# Patient Record
Sex: FEMALE | Race: WHITE
[De-identification: names, ages, dates, MRNs, and addresses within clinical notes are randomized per-mention and may not be internally consistent; named-entity substitution may affect disease eponyms.]

---

## 2018-02-15 ENCOUNTER — HOSPITAL ENCOUNTER (OUTPATIENT)
Dept: HOSPITAL 68 - STS | Age: 43
End: 2018-02-15
Attending: SPECIALIST
Payer: COMMERCIAL

## 2018-02-15 VITALS — HEIGHT: 65 IN | BODY MASS INDEX: 48.82 KG/M2 | WEIGHT: 293 LBS

## 2018-02-15 DIAGNOSIS — Z79.84: ICD-10-CM

## 2018-02-15 DIAGNOSIS — I10: ICD-10-CM

## 2018-02-15 DIAGNOSIS — N85.2: ICD-10-CM

## 2018-02-15 DIAGNOSIS — N92.0: Primary | ICD-10-CM

## 2018-02-15 DIAGNOSIS — E11.9: ICD-10-CM

## 2018-02-15 DIAGNOSIS — E66.01: ICD-10-CM

## 2018-02-19 NOTE — OPERATIVE REPORT
Operative/Inv Procedure Report
Surgery Date: 02/15/18
Name of Procedure:
D&C hysteroscopy
Pre-Operative Diagnosis:
Metromenorrhagia
Post-Operative Diagnosis:
Same
Estimated Blood Loss: 500
Surgeon/Assistant:
Yessica Aceves MD
 
Anesthesia: moderate sedation
 
Operative/Procedure Note
Note:
Patient was taken to the operating room placed in dorsal supine position.  After
adequate anesthesia, patient was prepped and draped for surgery.  Examination 
under anesthesia was performed.  CO2 tenaculum was placed on the anterior lip of
the cervix gentle downward traction was used.  The cervix was dilated 29 Hegar 
to left insertion of the hysteroscope.  Under direct visualization to 
hysteroscopy was performed using gas hysteroscope was removed and endocervical 
curettage was performed.  And endometrial curettage was performed.  All 
instruments removed from the vagina.  The counts were correct the patient was 
awakened from anesthesia.  And transported to recovery room awake and alert.
Findings:
Slightly enlarged uterus no adnexal masses heavy uterine lining normal

## 2018-04-22 ENCOUNTER — HOSPITAL ENCOUNTER (EMERGENCY)
Dept: HOSPITAL 68 - ERH | Age: 43
End: 2018-04-22
Payer: COMMERCIAL

## 2018-04-22 VITALS — BODY MASS INDEX: 50.02 KG/M2 | HEIGHT: 64 IN | WEIGHT: 293 LBS

## 2018-04-22 VITALS — SYSTOLIC BLOOD PRESSURE: 146 MMHG | DIASTOLIC BLOOD PRESSURE: 82 MMHG

## 2018-04-22 DIAGNOSIS — N93.9: Primary | ICD-10-CM

## 2018-04-22 DIAGNOSIS — N39.0: ICD-10-CM

## 2018-04-22 LAB
ABSOLUTE GRANULOCYTE CT: 7.6 /CUMM (ref 1.4–6.5)
APTT BLD: 32 SEC (ref 25–37)
BASOPHILS # BLD: 0 /CUMM (ref 0–0.2)
BASOPHILS NFR BLD: 0.5 % (ref 0–2)
EOSINOPHIL # BLD: 0.3 /CUMM (ref 0–0.7)
EOSINOPHIL NFR BLD: 2.7 % (ref 0–5)
ERYTHROCYTE [DISTWIDTH] IN BLOOD BY AUTOMATED COUNT: 14.5 % (ref 11.5–14.5)
GRANULOCYTES NFR BLD: 72.1 % (ref 42.2–75.2)
HCT VFR BLD CALC: 34.6 % (ref 37–47)
LYMPHOCYTES # BLD: 2.1 /CUMM (ref 1.2–3.4)
MCH RBC QN AUTO: 26.3 PG (ref 27–31)
MCHC RBC AUTO-ENTMCNC: 33.4 G/DL (ref 33–37)
MCV RBC AUTO: 78.7 FL (ref 81–99)
MONOCYTES # BLD: 0.5 /CUMM (ref 0.1–0.6)
PLATELET # BLD: 254 /CUMM (ref 130–400)
PMV BLD AUTO: 8.9 FL (ref 7.4–10.4)
PROTHROMBIN TIME: 10.2 SEC (ref 9.4–12.5)
RED BLOOD CELL CT: 4.4 /CUMM (ref 4.2–5.4)
WBC # BLD AUTO: 10.6 /CUMM (ref 4.8–10.8)

## 2018-04-22 NOTE — ULTRASOUND REPORT
EXAMINATION:
US TRANSVAGINAL
 
CLINICAL INFORMATION:
Heavy vaginal bleeding/pain.
 
COMPARISON:
No direct comparisons.
 
TECHNIQUE:
Transvaginal and transabdominal imaging of the pelvis was obtained with color
Doppler.
 
FINDINGS:
The uterus is anteverted measuring up to 11.5 x 5.3 x 4.8 cm. The uterus is
unremarkable in appearance. There is normal Doppler flow.
 
Endometrium is thickened measuring up to 2.1 cm and is slightly heterogenous
in echogenicity. Small amount of endometrial fluid.
 
Cervical length of 3.72 m.
 
The ovaries are not visualized.
 
No significant free fluid in the pelvis.
 
IMPRESSION:
Heterogenous and mildly thickened endometrium measuring up to 2.1 cm. Some of
the endometrial thickening may be due to a small amount of heterogenous
endometrial fluid. Consider follow-up ultrasound in 7-10 days or gynecologic
consultation.
 
Nonvisualization of the ovaries.

## 2018-04-22 NOTE — ED GI/GU/ABDOMINAL COMPLAINT
History of Present Illness
 
General
Chief Complaint: Abdominal Pain/Flank Pain
Stated Complaint: ABD PAIN
Source: patient
Exam Limitations: no limitations
 
Vital Signs & Intake/Output
Vital Signs & Intake/Output
 Vital Signs
 
 
Date Time Temp Pulse Resp B/P B/P Pulse O2 O2 Flow FiO2
 
     Mean Ox Delivery Rate 
 
 1453 98.0 79 20 146/82  99 Room Air  
 
 1250 98.2 78 18 188/95  99 Room Air  
 
 1059 97.8 103 15 153/89  99 Room Air Room Air 
 
 
 
Allergies
Coded Allergies:
adhesive tape (Intermediate, SKIN IRRITATION 18)
MDX - Latex (LATEX) (Mild, RASH 18)
 
Reconcile Medications
Acetaminophen (Tylenol Xstr) 500 MG TAB   2 TAB PO PRN HEADACHE  (Reported)
Albuterol Sulfate (Proventil) 2.5 MG/3 ML NEB   3 ML INH Q4P PRN SHORTNESS OF 
BREATH
Amoxicillin 500 MG CAPSULE   1 TAB PO TID BRONCHITIS
Azithromycin (Zithromax Z-Vignesh) 250 MG CAP   1 TAB PO DAILY INFN
     2 TABS ON DAY 1 THEN 1 TAB ONCE A DAY FOR 4 MORE DAYS
Diphenhydramine HCl (Benadryl) 50 MG/ML VIAL   25 MG IV Q6P PRN ITCH
Hyoscyamine (Levsin 0.125MG Tab) 0.125 MG TAB   1 TAB PO Q6 ABDOMINAL
Ibuprofen 800 MG TABLET   800 MG PO Q6P PRN PAIN SCALE 4-6
Loperamide Hydrochloride (Imodium) 2 MG CAP   1 TAB PO Q12 PRN Diarrhea
Nitrofurantoin Monohyd/M-Cryst (Macrobid 100 MG Capsule) 100 MG CAPSULE   1 CAP 
PO BID UTI
     with food
Omeprazole (Prilosec Otc) 20 MG TCP   1 TAB PO QDAY STOMACH PAIN
OXYCODONE HCL/ACETAMINOPHEN (Percocet 5-325 MG Tablet) 325 MG/5 MG TAB   2 TAB 
PO Q4P PRN PAIN SCALE 9-10
PNV#26/IRON POLY/FA/DHA (Vitafol-One Capsule) 29 MG IRON-1 MG-200 MG CAPSULE   1
CAP PO DAILY PRENATAL  (Reported)
Prednisone 20 MG TABLET   2 TAB PO DAILY BRONCH`0SPASM
Prednisone 10 MG TABLET   3 TAB PO DAILY BRONCHITIS
PSEUDOEPHEDRINE HCL (Sudafed 12 Hour) 120 MG TABLET.ER   1 TAB PO PRN DIFFICULTY
BREATHING/CONGESTIO  (Reported)
Sucralfate (Carafate 1GM <1000MG> Tab) 1,000 MG TAB   1 TAB PO 4 TIMES/DAY 
REFLUX/STOMACH PAIN
Tramadol HCl (Ultram) 50 MG TAB   1 TAB PO Q6 Abdominal pain
Tranexamic Acid (Lysteda) 650 MG TABLET   2 TAB PO TID HEAVY BLEEDING
     during menses
 
Triage Note:
PT TO ED FOR C/C OF LOWER ABD PAIN THAT STARTED
 AND HAS BEEN CONSTANT X 24 HOURS. PT HAS HAD
 CHANGES IN HER MENSTRUAL CYCLE AND HAS SEEN DR. FRANKLIN FOR IT.  HAS BEEN BLEEDING SINCE 
 AND HASN'T STOPPED. HAS HAD A D AND C TO HELP STOP
 BLEEDING WHICH TEMPORARILY WORKED BUT NOW BLEEDING
 CAME BACK.
Triage Nurses Notes Reviewed? yes
Pregnant? N
Is pt currently breastfeeding? No
Onset: Gradual
Duration: worse persistent since (1-2 MONTHS)
Timing: recent history
Quality/Severity: cramping
Severity Numbers: 8
Location: suprapubic
Radiation: no radiation
Activities at Onset: none
Prior Abdominal Problems: similar symptoms
Past Sexual History: Unobtainable at this time
No Modifying Factors: none
HPI:
Patient is a 44 YO female presenting to the emergency department chief complaint
of worsening lower abdominal pain, increased vaginal bleeding on the past 
several days.  She reports that she's been bleeding since .  She has been
seeing her OB/GYN for similar symptoms over the past 1-2 months.  She's had a D&
C without success, she was also placed on Depo-Provera without improvement.  She
saw her OB/GYN 2 days ago and the likely plan is to do a hysterectomy.  Patient 
came in today for worsening symptoms over the weekend.  She been taking 
ibuprofen 800 without relief.  Pain on arrival is currently 8 out of 10 and 
nonradiating.  Denies any urinary frequency urgency or dysuria.  No vaginal 
discharge besides bleeding.
(Shweta Urbina)
 
Past History
 
Travel History
Traveled to [place] past 21 day No
 
Medical History
Any Pertinent Medical History? see below for history
Neurological: NONE
EENT: NONE
Cardiovascular: NONE
Respiratory: NONE
Gastrointestinal: ventral hernia cholelithiasis GASTRITIS
Hepatic: NONE
Renal: NONE
Musculoskeletal: fibromyalgia
Psychiatric: NONE
Endocrine: NONE
GYN/Reproductive: 1 miscarriage gestational diabetes
Tetanus Vaccine: 12
 
Surgical History
Surgical History: , tonsillectomy
 
Psychosocial History
Services at Home None
What is your primary language English
Tobacco Use: Quit >30 days ago
ETOH Use: denies use
Illicit Drug Use: denies illicit drug use
 
Family History
Hx Contributory? No
(Shweta Urbina)
 
Review of Systems
 
Review of Systems
Constitutional:
Reports: no symptoms. 
Comments
Review of systems: See HPI, All other systems negative.
Constitutional, no chills fever or weight loss
HEENT: No visual changes no sore throat no congestion
Cardiovascular: No chest pain ,palpitation , orthopnea or ankle swelling
Skin, no jaundice no rashes
Respiratory: No dyspnea cough sputum or hemoptysis
GI: No nausea no vomiting
: No dysuria No hematuria
Muscle skeletal: no back pain, no neck pain,
Neurologic: No numbness no confusion, NO HEADACHES
Psych: No stress anxiety or depression,.
Heme/endocrine:  no polyuria or polydipsia
Immunology: No splenectomy or history of AIDS
(Shweta Urbina)
 
Physical Exam
 
Physical Exam
General Appearance: alert, awake, comfortable, obese
Gastrointestinal: normal bowel sounds, soft, tenderness
Comments:
Obese person in no acute distress
HEENT:Pupils equally round and reactive to light and accommodation. Nose is 
atraumatic.  No signs of pallor noted to ocular conjunctiva bilaterally.
Neck: Normal inspection, full range of motion
Back: Nontender, no CVA tenderness. 
Cardiovascular: Regular rate and rhythms no murmurs rubs or gallops, normal JVP
Respiratory: Chest nontender. No respiratory distress.breath sounds clear to 
auscultation bilaterally
Abdomen: Soft, obese, tender to palpation suprapubic region bilaterally, mild 
guarding, no rebound tenderness nondistended, no appreciable organomegaly. 
Normal bowel sounds. No ascites
Pelvic: Unable to visualize cervix secondary to extent of bleeding in the 
vaginal canal.  One large clot approximately 2-3 cm in size noted. NORMAL 
APPEARING EXTERNAL GENITALIA.
Extremity: No edema
Neuro: Alert oriented x3
Skin: No appreciable rash on exposed skin, skin is warm and dry.
Psych: Mood and affect is normal, memory and judgment is normal.
 
Core Measures
ACS in differential dx? No
Sepsis Present: No
Sepsis Focused Exam Completed? No
(Shweta Urbina)
 
Progress
Differential Diagnosis: DYSMENORRHEA, ACUTE ANEMIA, SYMPTOMATIC ANEMIA, 
ENDOMETRIOSIS, ENDOMETRITIS
Plan of Care:
 Orders
 
 
Procedure Date/time Status
 
Add-on Test (ER Only)  1516 Active
 
Add-on Test (ER Only)  1139 Active
 
CULTURE,URINE  1124 Active
 
TYPE & SCREEN (NOT X-MATCH)  112 Complete
 
PARTIAL THROMBOPLASTIN TIME  111 Complete
 
PROTHROMBIN TIME  111 Complete
 
URINE PREGNANCY  1103 Complete
 
URINALYSIS  1103 Complete
 
LIPASE  1103 Complete
 
COMPREHENSIVE METABOLIC PANEL 1103 Complete
 
CBC WITHOUT DIFFERENTIAL 1103 Complete
 
 
 Laboratory Tests
 
 
 
18 1124:
Urinalysis LIGHT  H, Urine Color YEL, Urine Clarity HAZY  H, Urine pH 6.0, Ur 
Specific Gravity >= 1.030, Urine Protein 30  H, Urine Ketones NEG, Urine Nitrite
NEG, Urine Bilirubin NEG, Urine Urobilinogen 0.2, Ur Leukocyte Esterase MOD  H, 
Ur Microscopic SEDIMENT EXAMINED, Urine RBC PACKD  H, Urine WBC 15-25  H, Ur 
Epithelial Cells FEW, Urine Bacteria MOD  H, Urine Mucus FEW, Urine Hemoglobin 
LARGE  H, Urine Glucose NEG, Urine Pregnancy Test NEGATIVE
 
18 1120:
Anion Gap 13, Estimated GFR > 60, BUN/Creatinine Ratio 17.1, Glucose 167  H, 
Calcium 9.2, Total Bilirubin 0.5, AST 13  L, ALT 20, Alkaline Phosphatase 71, 
Total Protein 7.2, Albumin 4.0, Globulin 3.2, Albumin/Globulin Ratio 1.3, Lipase
54, PT 10.2, INR 0.94, APTT 32, CBC w Diff NO MAN DIFF REQ, RBC 4.40, MCV 78.7  
L, MCH 26.3  L, MCHC 33.4, RDW 14.5, MPV 8.9, Gran % 72.1, Lymphocytes % 19.8  L
, Monocytes % 4.9, Eosinophils % 2.7, Basophils % 0.5, Absolute Granulocytes 7.6
 H, Absolute Lymphocytes 2.1, Absolute Monocytes 0.5, Absolute Eosinophils 0.3, 
Absolute Basophils 0
 Microbiology
1124  URINE ROUT: Urine Culture - RECD
 
 
 
2018 3:13:22 PM orthostatics are negative, H&H is stable.  Patient's vitals
are stable.  Spoke with Dr. Blanchard, patient's OB/GYN.  SHE recommended we start 
patient on Lysteda for the next 5 days and she'll follow-up with her in the 
office.  Patient agreeable to plan.  Educated on signs and symptoms to return.
Diagnostic Imaging:
Viewed by Me: Ultrasound.  Discussed w/RAD: Ultrasound. 
Radiology Impression: PATIENT: ADDIS PICKERING  MEDICAL RECORD NO: [id number] PRESENT 
AGE: 43  PATIENT ACCOUNT NO: [id number] : 75  LOCATION: [facility] ORDERING 
PHYSICIAN: Shweta GALINDO     SERVICE DATE:  EXAM TYPE: US - 
US-TRANSVAGINAL EXAMINATION: US TRANSVAGINAL CLINICAL INFORMATION: Heavy vaginal
bleeding/pain. COMPARISON: No direct comparisons. TECHNIQUE: Transvaginal and 
transabdominal imaging of the pelvis was obtained with color Doppler. FINDINGS: 
The uterus is anteverted measuring up to 11.5 x 5.3 x 4.8 cm. The uterus is 
unremarkable in appearance. There is normal Doppler flow. Endometrium is 
thickened measuring up to 2.1 cm and is slightly heterogenous in echogenicity. 
Small amount of endometrial fluid. Cervical length of 3.72 m. The ovaries are 
not visualized. No significant free fluid in the pelvis. IMPRESSION: 
Heterogenous and mildly thickened endometrium measuring up to 2.1 cm. Some of 
the endometrial thickening may be due to a small amount of heterogenous 
endometrial fluid. Consider follow-up ultrasound in 7-10 days or gynecologic 
consultation. Nonvisualization of the ovaries. DICTATED BY: Kyrie Murphy MD  
DATE/TIME DICTATED:18 :RAD.DOSS  DATE/TIME 
TRANSCRIBED:18 CONFIDENTIAL, DO NOT COPY WITHOUT APPROPRIATE 
AUTHORIZATION.  <Electronically signed in Other Vendor System>                  
                                                                     SIGNED BY: 
Kyrie Murphy MD 18 1416
Initial ED EKG: none
(Daniel GALINDO,Shweta)
 
Departure
 
Departure
Time of Disposition: 
Disposition: HOME OR SELF CARE
Condition: Stable
Clinical Impression
Primary Impression: Vaginal bleeding
Secondary Impressions: 
Urinary tract infection
Qualifiers:  Urinary tract infection type: site unspecified Hematuria presence: 
with hematuria Qualified Codes: N39.0 - Urinary tract infection, site not 
specified; R31.9 - Hematuria, unspecified
 
Referrals:
Debbie Lux (PCP/Family)
 
Additional Instructions:
Follow-up with OB/GYN, call tomorrow to make an appointment.  Increase fluids.  
Return for worsening symptoms or concernS.
 
Departure Forms:
Customer Survey
D/C INS-APPENDICITIS EXCLUSION
General Discharge Information
Prescriptions:
Current Visit Scripts
Tranexamic Acid (Lysteda) 2 TAB PO TID  
     #30 TAB 
     during menses
 
Nitrofurantoin Monohyd/M-Cryst (Macrobid 100 MG Capsule) 1 CAP PO BID  
     #14 CAP 
     with food
 
 
(Shweta Urbina)
 
PA/NP Co-Sign Statement
Statement:
ED Attending supervision documentation-
 
[] I saw and evaluated the patient. I have also reviewed all the pertinent lab 
results and diagnostic results. I agree with the findings and the plan of care 
as documented in the PA's/NP's documentation. 
 
[X] I have reviewed the ED Record and agree with the PA's/NP's documentation.
 
[] Additions or exceptions (if any) to the PAs/NP's note and plan are 
summarized below:
[]
 
(Irvin TOVAR,Jose VERAS)

## 2018-05-24 ENCOUNTER — HOSPITAL ENCOUNTER (INPATIENT)
Dept: HOSPITAL 68 - 2NB | Age: 43
LOS: 2 days | DRG: 513 | End: 2018-05-26
Attending: SPECIALIST | Admitting: SPECIALIST
Payer: COMMERCIAL

## 2018-05-24 VITALS — DIASTOLIC BLOOD PRESSURE: 77 MMHG | SYSTOLIC BLOOD PRESSURE: 133 MMHG

## 2018-05-24 VITALS — DIASTOLIC BLOOD PRESSURE: 80 MMHG | SYSTOLIC BLOOD PRESSURE: 150 MMHG

## 2018-05-24 VITALS — WEIGHT: 293 LBS | BODY MASS INDEX: 48.82 KG/M2 | HEIGHT: 65 IN

## 2018-05-24 VITALS — SYSTOLIC BLOOD PRESSURE: 134 MMHG | DIASTOLIC BLOOD PRESSURE: 74 MMHG

## 2018-05-24 VITALS — SYSTOLIC BLOOD PRESSURE: 150 MMHG | DIASTOLIC BLOOD PRESSURE: 80 MMHG

## 2018-05-24 DIAGNOSIS — Z79.84: ICD-10-CM

## 2018-05-24 DIAGNOSIS — E11.9: ICD-10-CM

## 2018-05-24 DIAGNOSIS — Z87.891: ICD-10-CM

## 2018-05-24 DIAGNOSIS — G93.2: ICD-10-CM

## 2018-05-24 DIAGNOSIS — N85.2: ICD-10-CM

## 2018-05-24 DIAGNOSIS — L91.8: ICD-10-CM

## 2018-05-24 DIAGNOSIS — E66.9: ICD-10-CM

## 2018-05-24 DIAGNOSIS — N92.0: Primary | ICD-10-CM

## 2018-05-24 DIAGNOSIS — I10: ICD-10-CM

## 2018-05-24 DIAGNOSIS — K44.9: ICD-10-CM

## 2018-05-24 PROCEDURE — 0HB4XZZ EXCISION OF NECK SKIN, EXTERNAL APPROACH: ICD-10-PCS | Performed by: SPECIALIST

## 2018-05-24 PROCEDURE — 0T788DZ DILATION OF BILATERAL URETERS WITH INTRALUMINAL DEVICE, VIA NATURAL OR ARTIFICIAL OPENING ENDOSCOPIC: ICD-10-PCS | Performed by: UROLOGY

## 2018-05-24 PROCEDURE — C9399 UNCLASSIFIED DRUGS OR BIOLOG: HCPCS

## 2018-05-24 PROCEDURE — 0UT90ZZ RESECTION OF UTERUS, OPEN APPROACH: ICD-10-PCS | Performed by: SPECIALIST

## 2018-05-24 PROCEDURE — 0UT70ZZ RESECTION OF BILATERAL FALLOPIAN TUBES, OPEN APPROACH: ICD-10-PCS | Performed by: SPECIALIST

## 2018-05-24 PROCEDURE — 3E0T3BZ INTRODUCTION OF ANESTHETIC AGENT INTO PERIPHERAL NERVES AND PLEXI, PERCUTANEOUS APPROACH: ICD-10-PCS | Performed by: ANESTHESIOLOGY

## 2018-05-24 NOTE — OPERATIVE REPORT
Operative/Inv Procedure Report
Surgery Date: 05/24/18
Name of Procedure:
To abdominal hysterectomy bilateral salpingectomy and skin tag removal
Pre-Operative Diagnosis:
Menorrhagia and skin tags
Post-Operative Diagnosis:
Same
Estimated Blood Loss: 500
Surgeon/Assistant:
Ketan TOVAR,Yessica West
 
Anesthesia: general endotracheal tube
 
Operative/Procedure Note
Note:
Procedure note patient was taken the operating room placed on position after 
adequate anesthesia patient placed in dorsolithotomy position the vagina from 
dorsal fashion bladder was catheterized and cystoscopy was performed by Ramón Emery MD will dictate that part case patient furthermore underwent a tap 
block.  As well she had an adequate timeout prior to induction of anesthesia 
patient was returned spine position the abdomen was prepped and draped so 
fashion through a Pfannenstiel skin incision skin was cut was carried down to 
rectus fascia using a Bovie at this point the rectus sheath was dissected 
bluntly as well as sharply off the rectus muscle patient tolerated that well 
peritoneum was entered high into the abdomen the Finn O'Broderick was placed in
the usual fashion with lap pads patient was placed in Trendelenburg the round 
ligament on the right was identified suture-ligated using 0 round and the left 
identified suture-ligated 0 on at this point.  Ligament was entered a bladder 
flap was developed sharply as well as bluntly sequentially cervical branches 
uterine artery clamped and cut on the right and left the level of the external 
os specimen was removed using a Bovie on at this point on the cervical stump was
identified on secondary to obesity visualization of the limited with the body of
the uterus in place the cervical stump was clamped using Gray ducts cut using 
Altaf's and removed the of vaginal cuff was oversewn using 0 Vicryl on 
interrupteds R running locking suture of 0 Vicryl was used on the serosa on and 
the uterosacrals behind the uterus.  On hemostasis was apparent on the right 
this point the right tube was picked up clamped cut using a Bovie and oversewn 
using 0 Vicryl left tube was picked up clamped cut using a Bovie and oversewn 
using 0 Vicryl hemostasis was apparent at this point all pedicles were 
reexamined and resutured I abdomen was area close amounts warm saline 3 found 
to be hemostatic and was started HEMANTH STA was applied to the cervical stump 
patient tolerated that well.  At this point the lap pads were removed counts 
correct peritoneum was reapproximated 0 fascia was reapproximated to continue 
sutures #1 the skin was reapproximated staples after Bovie coagulation 
subcutaneous tissue I at this point the neck was prepped and draped in sterile 
fashion and the on skin sites were injected with Marcaine and alert removed with
Bovie hemostasis was apparent instrument counts were correct the stents removed 
from the vagina on the patient was extubated awakened from anesthesia and 
transferred recovery room awake alert with counts correct
Findings:
3 skin tags on her left neck slightly enlarged uterus normal ovaries bilaterally
normal tubes

## 2018-05-24 NOTE — OPERATIVE REPORT
Operative/Inv Procedure Report
Surgery Date: 05/24/18
Name of Procedure:
cystoscopy: bilateral stent insertion
Pre-Operative Diagnosis:
Menometrorrhagia
Post-Operative Diagnosis:
Same
Estimated Blood Loss: none
Surgeon/Assistant:
MD Rupert, Ramón-urology
Anesthesia: general endotracheal tube
Drains:
16fr sommers
Specimens:
ucx
Complications:
none
 
Operative/Procedure Note
Note:
The patient was taken to the operating room and placed on the OR table in supine
position.  Timeout was performed, with the patient awake, to confirm identify,  
planned procedures, anesthesia, antibiotics and other pertinent mackenzie-operative 
information.  After adequate anesthesia, and IV antibiotics, the patient was 
placed in lithotomy Yellow-fin stirrups.  She was then draped and prepped in the
usual surgical fashion, including a vaginal prep.  A 22 Setswana cystoscope sheath
with a 30 angle lens was inserted into the bladder without significant 
difficulty.  The bladder was thoroughly and systematically examined, and was 
noted to be free of tumor, free of stone, free of endometriosis.  Both ureteral 
orifices were in their orthotopic positions with clear reflux bilaterally.  
Under direct visualization the left orifice was intubated with a 5 Setswana 
whistle-tip catheter, which was advanced easily into the left kidney pelvis.  
The right ureteral orifice was intubated with a second 5 Setswana ureteral whistle
tip catheter, and advanced into the right renal pelvis without difficulty.  For 
identification purposes the blue marked stent went into the left kidney and the 
right ureteral stent was marked red.  Urine culture was obtained and sent to 
pathology.  The cystoscope was then removed leaving both stents in proper place.
 An 18 Setswana Sommers catheter was inserted draining clear fluid and 10 mL of 
sterile water was then placed in the balloon.  The ends ureteral stents, which 
protruded externally, were taped to the Sommers catheter in order to secure their 
position.  The individual ureteral stents were then connected to their 
individual drainage devices.  The patient tolerated the procedure well.  All 
sponge needle and instrument count were correct at the end of this procedure.  
The patient was then placed in supine position with Venodyne's in place.  At 
this point, Dr.Vander Harper was able to proceed with the patient's surgery.
 
Discharge Disposition: proceed with Dr. Aceves
CC:
Ramón Emery MD

## 2018-05-25 VITALS — SYSTOLIC BLOOD PRESSURE: 130 MMHG | DIASTOLIC BLOOD PRESSURE: 83 MMHG

## 2018-05-25 VITALS — DIASTOLIC BLOOD PRESSURE: 61 MMHG | SYSTOLIC BLOOD PRESSURE: 91 MMHG

## 2018-05-25 VITALS — DIASTOLIC BLOOD PRESSURE: 83 MMHG | SYSTOLIC BLOOD PRESSURE: 130 MMHG

## 2018-05-25 VITALS — SYSTOLIC BLOOD PRESSURE: 130 MMHG | DIASTOLIC BLOOD PRESSURE: 60 MMHG

## 2018-05-25 VITALS — DIASTOLIC BLOOD PRESSURE: 76 MMHG | SYSTOLIC BLOOD PRESSURE: 135 MMHG

## 2018-05-25 VITALS — SYSTOLIC BLOOD PRESSURE: 130 MMHG | DIASTOLIC BLOOD PRESSURE: 80 MMHG

## 2018-05-25 VITALS — DIASTOLIC BLOOD PRESSURE: 60 MMHG | SYSTOLIC BLOOD PRESSURE: 120 MMHG

## 2018-05-25 VITALS — DIASTOLIC BLOOD PRESSURE: 66 MMHG | SYSTOLIC BLOOD PRESSURE: 124 MMHG

## 2018-05-25 LAB
ABSOLUTE GRANULOCYTE CT: 7.8 /CUMM (ref 1.4–6.5)
BASOPHILS # BLD: 0 /CUMM (ref 0–0.2)
BASOPHILS NFR BLD: 0.4 % (ref 0–2)
EOSINOPHIL # BLD: 0.1 /CUMM (ref 0–0.7)
EOSINOPHIL NFR BLD: 1 % (ref 0–5)
ERYTHROCYTE [DISTWIDTH] IN BLOOD BY AUTOMATED COUNT: 15.3 % (ref 11.5–14.5)
GRANULOCYTES NFR BLD: 74.4 % (ref 42.2–75.2)
HCT VFR BLD CALC: 30.7 % (ref 37–47)
LYMPHOCYTES # BLD: 1.9 /CUMM (ref 1.2–3.4)
MCH RBC QN AUTO: 26.1 PG (ref 27–31)
MCHC RBC AUTO-ENTMCNC: 32.7 G/DL (ref 33–37)
MCV RBC AUTO: 79.8 FL (ref 81–99)
MONOCYTES # BLD: 0.6 /CUMM (ref 0.1–0.6)
PLATELET # BLD: 245 /CUMM (ref 130–400)
PMV BLD AUTO: 9.1 FL (ref 7.4–10.4)
RED BLOOD CELL CT: 3.85 /CUMM (ref 4.2–5.4)
WBC # BLD AUTO: 10.5 /CUMM (ref 4.8–10.8)

## 2018-05-25 NOTE — PN- POST DELIVERY/GYN
Subjective
Subjective:
I am hungry
 
Objective
Last 24 Hrs of Vital Signs/I&O
 Vital Signs
 
 
Date Time Temp Pulse Resp B/P B/P Pulse O2 O2 Flow FiO2
 
     Mean Ox Delivery Rate 
 
05/25 1221 98.7 84 20 120/60  97 Room Air  
 
05/25 1027 98.4 78 20 130/80  97 Room Air  
 
05/25 0822  75  138/83     
 
05/25 0731 98.0 75 20 130/83  96 Room Air  
 
05/25 0432 98.3 80 20 135/76  99 Room Air  
 
05/25 0159 98.8 77 18 91/61  94 Room Air  
 
05/24 2257 98.6 91 20 134/74  94 Room Air  
 
05/24 2042 98.1 84 20 133/77  93 Room Air  
 
05/24 2027  84  133/77     
 
 
 Intake & Output
 
 
 05/25 1600 05/25 0800 05/25 0000
 
Intake Total  1120 495
 
Output Total 350 500 
 
Balance -350 620 495
 
    
 
Intake, IV  1000 375
 
Intake, Oral  120 120
 
Output, Urine 350 500 
 
 
 
Physical Exam:
Obese white female HEENT anicteric
Abdomen soft nontender
Incision clean dry and intact with staples in place
Extremities negative edema negative Homans
 
Assessment/Plan
Assessment/Plan
Assessment status post total abdominal hysterectomy for menorrhagia plan 
continue labetalol advanced diet advance ambulation potential discharge in a.m.

## 2018-05-26 VITALS — SYSTOLIC BLOOD PRESSURE: 120 MMHG | DIASTOLIC BLOOD PRESSURE: 72 MMHG

## 2018-05-26 NOTE — PN- GENERAL SURGERY
Subjective
Subjective:
NO C/O
Review of Systems:
POS FLATUS
 
Objective
Vital Signs and I&Os
Vital Signs
 
 
Date Time Temp Pulse Resp B/P B/P Pulse O2 O2 Flow FiO2
 
     Mean Ox Delivery Rate 
 
05/26 0833  82  120/72     
 
05/26 0610 98.4 82 16 120/72  97 Room Air  
 
05/25 2106 99.0 88 16 124/66  95 Room Air  
 
05/25 2047  88  124/66     
 
05/25 1453 98.2 88 20 130/60  98 Room Air  
 
05/25 1221 98.7 84 20 120/60  97 Room Air  
 
05/25 1220 98.7 84 20 120/60     
 
05/25 1027 98.4 78 20 130/80  97 Room Air  
 
 
 Intake & Output
 
 
 05/26 1600 05/26 0800 05/26 0000 05/25 1600 05/25 0800 05/25 0000
 
Intake Total  120  1295 1120 495
 
Output Total    850 500 
 
Balance  120  445 620 495
 
       
 
Intake, IV    675 1000 375
 
Intake, Oral  120  620 120 120
 
Number    0  
 
Bowel      
 
Movements      
 
Output, Urine    850 500 
 
 
 
Physical Exam:
INCSION C/D/I
EXT NT
 
Assessment/Plan
Assessment/Plan
S/P CALOS POD2 STABLE\
DISCHARGE HOME
F/U 1 WEEK
Problem List:
 1. S/P total abdominal hysterectomy
 
 
Core Measures
 
Venous Thromboembolism
VTE Risk Factors Obesity
No Mechanical VTE Prophylaxis d/t N/A MechProphylax Ordered
No VTE Pharm Prophylaxis d/t NA PharmProphylax ordered